# Patient Record
Sex: FEMALE | URBAN - METROPOLITAN AREA
[De-identification: names, ages, dates, MRNs, and addresses within clinical notes are randomized per-mention and may not be internally consistent; named-entity substitution may affect disease eponyms.]

---

## 2024-06-24 ENCOUNTER — OFFICE VISIT (OUTPATIENT)
Dept: URBAN - METROPOLITAN AREA CLINIC 82 | Facility: CLINIC | Age: 29
End: 2024-06-24
Payer: COMMERCIAL

## 2024-06-24 ENCOUNTER — LAB OUTSIDE AN ENCOUNTER (OUTPATIENT)
Dept: URBAN - METROPOLITAN AREA CLINIC 82 | Facility: CLINIC | Age: 29
End: 2024-06-24

## 2024-06-24 ENCOUNTER — DASHBOARD ENCOUNTERS (OUTPATIENT)
Age: 29
End: 2024-06-24

## 2024-06-24 VITALS
HEART RATE: 96 BPM | HEIGHT: 63 IN | TEMPERATURE: 97.3 F | WEIGHT: 191.8 LBS | SYSTOLIC BLOOD PRESSURE: 128 MMHG | DIASTOLIC BLOOD PRESSURE: 81 MMHG | BODY MASS INDEX: 33.98 KG/M2

## 2024-06-24 DIAGNOSIS — Z87.19 HX OF GALLSTONES: ICD-10-CM

## 2024-06-24 DIAGNOSIS — R10.11 RUQ PAIN: ICD-10-CM

## 2024-06-24 PROBLEM — 301717006: Status: ACTIVE | Noted: 2024-06-24

## 2024-06-24 PROBLEM — 407637009: Status: ACTIVE | Noted: 2024-06-24

## 2024-06-24 PROCEDURE — 99203 OFFICE O/P NEW LOW 30 MIN: CPT | Performed by: STUDENT IN AN ORGANIZED HEALTH CARE EDUCATION/TRAINING PROGRAM

## 2024-06-24 NOTE — HPI-TODAY'S VISIT:
29 y.o female patient presents today for c/o of gallstones. Was dx during her pregnancy, at 36 week. After giving birth, pain subsided but has now returned. Baby is now 5 months. States that for the past two weeks, intermittent pain in the epigatric region that radiates to the back to the back and stays there. More w eating. +nausea, no vomiting. Ocasional worse w greasy meals. Denies any severa heartburns, indigestion. BM daily.